# Patient Record
Sex: FEMALE | Race: WHITE
[De-identification: names, ages, dates, MRNs, and addresses within clinical notes are randomized per-mention and may not be internally consistent; named-entity substitution may affect disease eponyms.]

---

## 2018-08-27 ENCOUNTER — HOSPITAL ENCOUNTER (OUTPATIENT)
Dept: HOSPITAL 25 - OREAST | Age: 78
Discharge: HOME | End: 2018-08-27
Attending: OPHTHALMOLOGY
Payer: COMMERCIAL

## 2018-08-27 VITALS — SYSTOLIC BLOOD PRESSURE: 138 MMHG | DIASTOLIC BLOOD PRESSURE: 76 MMHG

## 2018-08-27 DIAGNOSIS — E03.9: ICD-10-CM

## 2018-08-27 DIAGNOSIS — J30.89: ICD-10-CM

## 2018-08-27 DIAGNOSIS — Z87.891: ICD-10-CM

## 2018-08-27 DIAGNOSIS — H25.11: Primary | ICD-10-CM

## 2018-08-27 PROCEDURE — V2787 ASTIGMATISM-CORRECT FUNCTION: HCPCS

## 2018-08-27 NOTE — OP
DATE OF OPERATION:  08/27/18 - [facility]

 

DATE OF BIRTH:  [birth year]

 

SURGEON:  Celestine Brown MD

 

ANESTHESIA:  Monitored anesthesia care.

 

PREOPERATIVE DIAGNOSIS:  Cataract, right eye with astigmatism.

 

POSTOPERATIVE DIAGNOSIS:  Cataract, right eye with astigmatism.

 

OPERATIVE PROCEDURE:  Extracapsular cataract extraction of the right eye with 
intraocular lens implant.

 

IMPLANT:  SN6AT9 26.5 diopter lens at 174 degrees to the right eye.

 

COMPLICATIONS:  None.

 

DESCRIPTION OF PROCEDURE:  The patient was given phenylephrine 2.5 % and 
cyclopentolate 1% eye drops to the operative eye in the preoperative area.  
With the patient sitting in upright position, corneal markings were placed to 
assist in toric lens placement.  The patient was taken to the operating room, 
where a time- out was taken to identify the correct patient, site, and side of 
surgery.  The patient's right eye was prepped and draped in the usual sterile 
fashion with 5% Betadine.  A second time-out was taken to verify the correct 
patient, site, and side of surgery and correct lens implant.  A lid speculum 
was placed to the right eye.  Corneal markings were then placed at 174 degrees.
  A 1 mm paracentesis blade was used to make a clear corneal incision in the 
superotemporal position. Preservative-free 1% lidocaine was injected into the 
anterior chamber.  DisCoVisc was then injected into the anterior chamber.  A 
2.75 mm keratome blade was used to make a triplanar incision at the 
inferotemporal position.  A cystotome initiated a capsulorrhexis which was 
completed with Utrata forceps in a continuous and curvilinear manner.  
Hydrodissection of the lens was performed with BSS on a cannula.  The lens 
could be spun in a capsular bag.  The phacoemulsification handpiece was used 
with a divide-and-conquer technique to remove the nucleus with 9.29 CDE.  The I/
A handpiece then removed the residual cortical lens material. DisCoVisc was 
then injected to inflate the capsular bag.  The planned SN6AT9 26.5 diopter 
lens was then injected into the capsular bag and rotated to 174 degrees. The 
residual DisCoVisc was removed from the eye with the I/A handpiece.  The 
corneal incisions were hydrated and no leaks occurred at physiologic pressure 
around 20 mmHg per palpation.  The lid speculum was removed and drapes were 
removed.  Maxitrol ointment was placed to the surface of the operative eye.  An 
adhesive patch and shield was then placed on the operative eye.  The patient 
was taken to the postoperative area in stable condition.

 

 [id number]/CPS #: [id number]

MTDD

## 2018-09-24 ENCOUNTER — HOSPITAL ENCOUNTER (OUTPATIENT)
Dept: HOSPITAL 25 - OREAST | Age: 78
Discharge: HOME | End: 2018-09-24
Attending: OPHTHALMOLOGY
Payer: COMMERCIAL

## 2018-09-24 VITALS — SYSTOLIC BLOOD PRESSURE: 155 MMHG | DIASTOLIC BLOOD PRESSURE: 75 MMHG

## 2018-09-24 DIAGNOSIS — I10: ICD-10-CM

## 2018-09-24 DIAGNOSIS — E03.9: ICD-10-CM

## 2018-09-24 DIAGNOSIS — Z87.891: ICD-10-CM

## 2018-09-24 DIAGNOSIS — H52.202: ICD-10-CM

## 2018-09-24 DIAGNOSIS — E78.00: ICD-10-CM

## 2018-09-24 DIAGNOSIS — K21.9: ICD-10-CM

## 2018-09-24 DIAGNOSIS — H25.12: Primary | ICD-10-CM

## 2018-09-24 PROCEDURE — V2787 ASTIGMATISM-CORRECT FUNCTION: HCPCS

## 2018-09-24 NOTE — OP
DATE OF OPERATION:  09/24/18 - [facility]

 

DATE OF BIRTH:  [birth year]

 

SURGEON:  Celestine Brown MD

 

ANESTHESIA:  Monitored anesthesia care.

 

PRE-OP DIAGNOSIS:  Cataract, left eye, with stigmatism.

 

POST-OP DIAGNOSIS:  Cataract, left eye, with stigmatism.

 

OPERATIVE PROCEDURE:  Extracapsular cataract extraction of the left eye with 
intraocular lens implant.

 

IMPLANT:  SN6AT9 27.5 diopter lens at 178 degrees to the left eye.

 

COMPLICATIONS:  None.

 

DESCRIPTION OF PROCEDURE:  The patient was given phenylephrine 2.5% and 
cyclopentolate 1% eye drops to the operative eye in the preoperative area.  The 
patient was sat in the upright position and corneal markings were placed to 
facilitate toric lens placement.  The patient was taken to the operating room 
where a time-out was taken to identify the correct patient, site, and side of 
surgery. The patient's left eye was prepped and draped in the usual sterile 
fashion with 5% Betadine.  A second time-out was taken to verify the correct 
patient, site and side of surgery and correct lens implant.  A lid speculum was 
placed to the left eye. Corneal markings were then placed at 178-degree 
meridian using a toric . A 1-mm paracentesis blade was used to make 
a clear corneal incision in the inferotemporal position.  Preservative-free 1% 
lidocaine was injected into the anterior chamber.  DisCoVisc was then injected 
into the anterior chamber.  A 2.75- mm keratome blade was used to make a 
triplanar incision at the superotemporal position.  A cystotome initiated a 
capsulorrhexis which was completed with Utrata forceps in a continuous and 
curvilinear manner.  Hydrodissection of the lens was performed with BSS on a 
cannula.  The lens could be spun in the capsular bag.  The phacoemulsification 
handpiece was used with a divide-and-conquer technique to remove the nucleus 
with 14.70 CDE.  The I/A handpiece then removed the residual cortical lens 
material.  DisCoVisc was injected to inflate the capsular bag.  The planned 
SN6AT9 27.5 diopter lens was then injected in the capsular bag and rotated to 
178 degrees.  The residual DisCoVisc was removed from the eye with the I/A 
handpiece.  The corneal incisions were hydrated and no leaks occurred at 
physiologic pressure around 20 mmHg.  The lens was again confirmed to be at 178 
degrees.  The lid speculum was removed and drapes removed.  Maxitrol ointment 
was placed to the surface of the operative eye.  An adhesive patch and shield 
was then placed on the operative eye.  The patient was taken to the 
postoperative area in stable condition.

 

 [id number]/CPS #: [id number]

MTDD